# Patient Record
Sex: FEMALE | Race: WHITE | NOT HISPANIC OR LATINO | Employment: OTHER | URBAN - METROPOLITAN AREA
[De-identification: names, ages, dates, MRNs, and addresses within clinical notes are randomized per-mention and may not be internally consistent; named-entity substitution may affect disease eponyms.]

---

## 2017-01-23 RX ORDER — DESVENLAFAXINE SUCCINATE 50 MG
TABLET, EXTENDED RELEASE 24 HR ORAL
Qty: 90 TAB | Refills: 1 | Status: SHIPPED | OUTPATIENT
Start: 2017-01-23 | End: 2017-11-22

## 2017-02-06 ENCOUNTER — OFFICE VISIT (OUTPATIENT)
Dept: BEHAVIORAL HEALTH | Facility: PHYSICIAN GROUP | Age: 57
End: 2017-02-06
Payer: OTHER GOVERNMENT

## 2017-02-06 VITALS
HEART RATE: 80 BPM | RESPIRATION RATE: 16 BRPM | DIASTOLIC BLOOD PRESSURE: 82 MMHG | HEIGHT: 66 IN | TEMPERATURE: 98.1 F | SYSTOLIC BLOOD PRESSURE: 122 MMHG | WEIGHT: 174 LBS | BODY MASS INDEX: 27.97 KG/M2

## 2017-02-06 DIAGNOSIS — F41.1 GENERALIZED ANXIETY DISORDER: ICD-10-CM

## 2017-02-06 DIAGNOSIS — F33.0 MILD EPISODE OF RECURRENT MAJOR DEPRESSIVE DISORDER (HCC): ICD-10-CM

## 2017-02-06 PROCEDURE — 99213 OFFICE O/P EST LOW 20 MIN: CPT | Performed by: PSYCHIATRY & NEUROLOGY

## 2017-02-06 RX ORDER — ALPRAZOLAM 0.5 MG/1
0.5 TABLET ORAL NIGHTLY PRN
Qty: 30 TAB | Refills: 0 | Status: SHIPPED | OUTPATIENT
Start: 2017-02-06

## 2017-02-06 NOTE — PROGRESS NOTES
"[facility]  PSYCHIATRIC FOLLOW-UP NOTE    Name: Alyssa Monaco  MRN: [id number]  : 1960  Age: 56 y.o.  Date of assessment: 2017  PCP: STEVE Membreno  Persons in attendance: Patient    REASON FOR VISIT/CHIEF COMPLAINT (as stated by Patient):  Alyssa Monaco is a 56 y.o., White female, attending follow-up appointment for   Chief Complaint   Patient presents with   • Depression     The patient is seen today for follow up on her depression, anxiety, and insomnia. The patient reported dry mouth, and itching. The patient denied any rash.    .      HISTORY OF PRESENT ILLNESS:    The patient reported trouble getting in to sleep and she has been using lorazepam if needed. The patient reported dry mouth and mid itching in her lower extremities.    PSYCHOSOCIAL CHANGES SINCE PREVIOUS CONTACT:  The patients depression improved.    RESPONSE TO TREATMENT:  Good.    MEDICATION SIDE EFFECTS:  Dry mouth and mild itching in her hands and feet.    REVIEW OF SYSTEMS:        Constitutional positive - back pain.   Eyes negative   Ears/Nose/Mouth/Throat negative   Cardiovascular negative   Respiratory negative   Gastrointestinal negative   Genitourinary negative   Muscular negative   Integumentary negative   Neurological positive - tremor.   Endocrine positive - hyperlipidemia.   Hematologic/Lymphatic negative       PSYCHIATRIC EXAMINATION/MENTAL STATUS  /82 mmHg  Pulse 80  Temp(Src) 36.7 °C (98.1 °F)  Resp 16  Ht 1.676 m (5' 5.98\")  Wt 78.926 kg (174 lb)  BMI 28.10 kg/m2  Participation: Active verbal participation, Attentive and Engaged  Grooming:Casual  Orientation: Alert and Fully Oriented  Eye contact: Good  Behavior:Calm   Mood: Anxious  Affect: Flexible and Full range  Thought process: Logical and Goal-directed  Thought content:  Within normal limits  Speech: Rate within normal limits and Volume within normal limits  Perception:  Within normal limits  Memory:  No gross evidence of " memory deficits  Insight: Good  Judgment: Good  Family/couple interaction observations:   Other:    Current risk:    Suicide: Low   Homicide: Low   Self-harm: Low  Relapse: Low  Other:   Crisis Safety Plan reviewed?Yes  If evidence of imminent risk is present, intervention/plan:    Medical Records/Labs/Diagnostic Tests Reviewed: yes.    Medical Records/Labs/Diagnostic Tests Ordered: none.    DIAGNOSTIC IMPRESSION(S):  1. Mild episode of recurrent major depressive disorder (CMS-HCC)    2. Generalized anxiety disorder           ASSESSMENT AND PLAN:  Major depression  MACIEJ.    Decrease lamotrigine 100 mg per day  Pristiq 50 at night  Ambien and lorazepam as needed for sleep.  Follow up in two months.  More than 50% of face-to-face time in this 30 minute visit was spent in psychotherapy/counseling.    Topics addressed include:    Good sleep hygiene  Cognitive restructuring and behavioral changes  Improving interpersonal relationship.     Jayden Ruiz M.D.

## 2017-02-15 ENCOUNTER — TELEPHONE (OUTPATIENT)
Dept: BEHAVIORAL HEALTH | Facility: PHYSICIAN GROUP | Age: 57
End: 2017-02-15

## 2017-02-15 NOTE — TELEPHONE ENCOUNTER
Called back and she reported that she's been having increased depressive symptoms since her dose of Lamictal was decreased to 100 mg last week because of itchy skin. She has not noticed a difference in her skin problems with the Lamictal dose decrease but has noticed a significant decline in her mood. She is endorsing most symptoms of depression and is having difficulty getting out of the bed and motivation to do stuff around the house. She denies having a rash but endorses itchiness and her arms, knees and labial area. She has been working with her primary care physician and is using a steroid cream and over-the-counter Benadryl. Discussed increasing the dose of Lamictal to 150 mg to see if her mood improves. Instructed her to keep an eye on her skin problems and if she notices a rash to call the clinic again to discuss it further. She will call back next week to see if she needs to stay on 150 mg or increase it to 200 mg which was her previous dose.      ----- Message from Triston Harris Ass't sent at 2/15/2017  8:51 AM PST -----  Regarding: Decrease lamotrigine 100 mg per day  Contact: [phone]  Pt called stating that Dr. GONZALES, lowered her dose of the above Rx at the 2/6/17 appointment. Pt is experiencing depression, and a lot of negative thinking. Pt would like to know if she should go back to her normal dose, because at least then she can function (per pt).

## 2017-02-27 ENCOUNTER — OFFICE VISIT (OUTPATIENT)
Dept: BEHAVIORAL HEALTH | Facility: PHYSICIAN GROUP | Age: 57
End: 2017-02-27
Payer: OTHER GOVERNMENT

## 2017-02-27 VITALS
DIASTOLIC BLOOD PRESSURE: 76 MMHG | SYSTOLIC BLOOD PRESSURE: 120 MMHG | TEMPERATURE: 98.1 F | HEIGHT: 66 IN | WEIGHT: 174 LBS | HEART RATE: 86 BPM | RESPIRATION RATE: 16 BRPM | BODY MASS INDEX: 27.97 KG/M2

## 2017-02-27 DIAGNOSIS — F33.2 SEVERE EPISODE OF RECURRENT MAJOR DEPRESSIVE DISORDER, WITHOUT PSYCHOTIC FEATURES (HCC): ICD-10-CM

## 2017-02-27 PROCEDURE — 99213 OFFICE O/P EST LOW 20 MIN: CPT | Performed by: PSYCHIATRY & NEUROLOGY

## 2017-02-27 NOTE — PROGRESS NOTES
"[facility]  PSYCHIATRIC FOLLOW-UP NOTE    Name: Alyssa Monaco  MRN: [id number]  : 1960  Age: 56 y.o.  Date of assessment: 2017  PCP: STEVE Membreno  Persons in attendance: Patient    REASON FOR VISIT/CHIEF COMPLAINT (as stated by Patient):  Alyssa Monaco is a 56 y.o., White female, attending follow-up appointment for   Chief Complaint   Patient presents with   • Depression     The patient is seen today on emergency for irritataed depression and sideeffects from lamictal.   .      HISTORY OF PRESENT ILLNESS:    This is a 57 yo female, single, lives alone, seen today for follow up on her depression. The patient increased lamotrigine and her itching has been worse and she is getting rash in her mouth. The patient has been irritated all the time and she is sleeping onle few hours at night.    PSYCHOSOCIAL CHANGES SINCE PREVIOUS CONTACT:  Depressed, anxious, and irritataed.    RESPONSE TO TREATMENT:  Poor.    MEDICATION SIDE EFFECTS:  Rash.    REVIEW OF SYSTEMS:        Constitutional negative   Eyes negative   Ears/Nose/Mouth/Throat negative   Cardiovascular negative   Respiratory positive - sleep apnea, asthma   Gastrointestinal negative   Genitourinary negative   Muscular negative   Integumentary negative   Neurological negative   Endocrine positive - hyperlipidemia   Hematologic/Lymphatic negative       PSYCHIATRIC EXAMINATION/MENTAL STATUS  /76 mmHg  Pulse 86  Temp(Src) 36.7 °C (98.1 °F)  Resp 16  Ht 1.676 m (5' 5.98\")  Wt 78.926 kg (174 lb)  BMI 28.10 kg/m2  Participation: Active verbal participation and Attentive  Grooming:Casual  Orientation: Alert and Fully Oriented  Eye contact: Good  Behavior:Tense   Mood: Depressed and Anxious  Affect: Labile and Sad  Thought process: Logical and Goal-directed  Thought content:  Within normal limits  Speech: Rate within normal limits and Volume within normal limits  Perception:  Within normal limits  Memory:  No gross evidence " of memory deficits  Insight: Good  Judgment: Good  Family/couple interaction observations:   Other:    Current risk:    Suicide: Low   Homicide: Low   Self-harm: Low  Relapse: Low  Other:   Crisis Safety Plan reviewed?Yes  If evidence of imminent risk is present, intervention/plan:    Medical Records/Labs/Diagnostic Tests Reviewed: yes.    Medical Records/Labs/Diagnostic Tests Ordered: none.    DIAGNOSTIC IMPRESSION(S):  1. Severe episode of recurrent major depressive disorder, without psychotic features (CMS-HCC)           ASSESSMENT AND PLAN:  Major depression  Decrease lamotrigine 100 mg for one week, then half for one week then stop  Start aripiprazole 2 mg in AM after discussing risk, benefit, and alternative # 30 refills one  Continue pristiq 50 mg in AM  Ambien and xanax onle if needed and follow up in two weeks.     More than 50% of face-to-face time in this 30 minute visit was spent in psychotherapy/counseling.    Topics addressed include:  Good sleep hygiene  Cognitive restructuring and behavioral changes.    Jayden Ruiz M.D.

## 2017-03-10 ENCOUNTER — OFFICE VISIT (OUTPATIENT)
Dept: BEHAVIORAL HEALTH | Facility: PHYSICIAN GROUP | Age: 57
End: 2017-03-10
Payer: OTHER GOVERNMENT

## 2017-03-10 VITALS
WEIGHT: 174 LBS | HEART RATE: 82 BPM | DIASTOLIC BLOOD PRESSURE: 80 MMHG | TEMPERATURE: 98.1 F | HEIGHT: 66 IN | SYSTOLIC BLOOD PRESSURE: 124 MMHG | BODY MASS INDEX: 27.97 KG/M2

## 2017-03-10 DIAGNOSIS — F33.0 MILD EPISODE OF RECURRENT MAJOR DEPRESSIVE DISORDER (HCC): ICD-10-CM

## 2017-03-10 DIAGNOSIS — F41.1 GAD (GENERALIZED ANXIETY DISORDER): ICD-10-CM

## 2017-03-10 PROCEDURE — 99213 OFFICE O/P EST LOW 20 MIN: CPT | Performed by: PSYCHIATRY & NEUROLOGY

## 2017-03-10 RX ORDER — ARIPIPRAZOLE 2 MG/1
2 TABLET ORAL DAILY
Qty: 90 TAB | Refills: 1 | Status: SHIPPED | OUTPATIENT
Start: 2017-03-10

## 2017-03-10 NOTE — PROGRESS NOTES
"[facility]  PSYCHIATRIC FOLLOW-UP NOTE    Name: Alyssa Monaco  MRN: [id number]  : 1960  Age: 56 y.o.  Date of assessment: 3/10/2017  PCP: STEVE Membreno  Persons in attendance: Patient    REASON FOR VISIT/CHIEF COMPLAINT (as stated by Patient):  Alyssa Monaco is a 56 y.o., White female, attending follow-up appointment for   Chief Complaint   Patient presents with   • Depression     The patient is seen today for follow up and she has been feeling good on abilify 2 mg .   .      HISTORY OF PRESENT ILLNESS:    This is a 57 yo female, seen today for follow up and she has been feeling well. The patient has been taking pristiq 50 mg. Abilify 2 mg and uses ambien as needed. The patient reported her mind is clear and she is able to focus and her motivation improved.    PSYCHOSOCIAL CHANGES SINCE PREVIOUS CONTACT:  Depression improved.    RESPONSE TO TREATMENT:  Good.    MEDICATION SIDE EFFECTS:  Denied.    REVIEW OF SYSTEMS:        Constitutional positive - back pain   Eyes negative   Ears/Nose/Mouth/Throat negative   Cardiovascular negative   Respiratory positive - asthma   Gastrointestinal negative   Genitourinary negative   Muscular negative   Integumentary negative   Neurological positive - tremor   Endocrine positive - hyperlipidemia   Hematologic/Lymphatic negative       PSYCHIATRIC EXAMINATION/MENTAL STATUS  /80 mmHg  Pulse 82  Temp(Src) 36.7 °C (98.1 °F)  Ht 1.676 m (5' 5.98\")  Wt 78.926 kg (174 lb)  BMI 28.10 kg/m2  Participation: Active verbal participation, Attentive and Engaged  Grooming:Casual  Orientation: Alert and Fully Oriented  Eye contact: Good  Behavior:Calm   Mood: Anxious  Affect: Flexible and Full range  Thought process: Logical and Goal-directed  Thought content:  Within normal limits  Speech: Rate within normal limits and Volume within normal limits  Perception:  Within normal limits  Memory:  No gross evidence of memory deficits  Insight: Good  Judgment: " Good  Family/couple interaction observations:   Other:    Current risk:    Suicide: Low   Homicide: Low   Self-harm: Low  Relapse: Low  Other:   Crisis Safety Plan reviewed?Yes  If evidence of imminent risk is present, intervention/plan:    Medical Records/Labs/Diagnostic Tests Reviewed: yes.    Medical Records/Labs/Diagnostic Tests Ordered: none.    DIAGNOSTIC IMPRESSION(S):  1. Mild episode of recurrent major depressive disorder (CMS-HCC)    2. MACIEJ (generalized anxiety disorder)           ASSESSMENT AND PLAN:  Major depression  MACIEJ.  pristiq 50 mg one at night  Abilify 2 mg in AM # 90 refills one  Ambien 10 mg as needed  Stop lamotrigine.  Follow up in one month    More than 50% of face-to-face time in this 30 minute visit was spent in psychotherapy/counseling.    Topics addressed include:    Jayden Ruiz M.D.

## 2017-03-17 RX ORDER — LAMOTRIGINE 200 MG/1
TABLET ORAL
Qty: 90 TAB | Refills: 1 | Status: SHIPPED | OUTPATIENT
Start: 2017-03-17

## 2017-04-06 ENCOUNTER — OFFICE VISIT (OUTPATIENT)
Dept: BEHAVIORAL HEALTH | Facility: PHYSICIAN GROUP | Age: 57
End: 2017-04-06
Payer: OTHER GOVERNMENT

## 2017-04-06 VITALS
HEART RATE: 78 BPM | BODY MASS INDEX: 27.32 KG/M2 | SYSTOLIC BLOOD PRESSURE: 118 MMHG | WEIGHT: 170 LBS | TEMPERATURE: 98.1 F | DIASTOLIC BLOOD PRESSURE: 78 MMHG | RESPIRATION RATE: 16 BRPM | HEIGHT: 66 IN

## 2017-04-06 DIAGNOSIS — F33.0 MILD EPISODE OF RECURRENT MAJOR DEPRESSIVE DISORDER (HCC): ICD-10-CM

## 2017-04-06 DIAGNOSIS — F41.1 GAD (GENERALIZED ANXIETY DISORDER): ICD-10-CM

## 2017-04-06 PROCEDURE — 99213 OFFICE O/P EST LOW 20 MIN: CPT | Performed by: PSYCHIATRY & NEUROLOGY

## 2017-04-06 PROCEDURE — 90833 PSYTX W PT W E/M 30 MIN: CPT | Performed by: PSYCHIATRY & NEUROLOGY

## 2017-04-06 NOTE — PROGRESS NOTES
"[facility]  PSYCHIATRIC FOLLOW-UP NOTE    Name: Alyssa Monaco  MRN: [id number]  : 1960  Age: 56 y.o.  Date of assessment: 2017  PCP: STEVE Membreno  Persons in attendance: Patient    REASON FOR VISIT/CHIEF COMPLAINT (as stated by Patient):  Alyssa Monaco is a 56 y.o., White female, attending follow-up appointment for   Chief Complaint   Patient presents with   • Depression     The patient is seen today for follow up and she has been feeling better after a long time.    .      HISTORY OF PRESENT ILLNESS:    This is a 55 yo female, single, seen today for follow up. The patient is back in her old self and she is active and started to feel better. The patient is meditating thirty minutes twice a day. The patient is physically active and lost few pounds. The patient is working on her book.    PSYCHOSOCIAL CHANGES SINCE PREVIOUS CONTACT:  Less depressed and less anxious.    RESPONSE TO TREATMENT:  Good.    MEDICATION SIDE EFFECTS:  Denied.    REVIEW OF SYSTEMS:        Constitutional positive - low back pain   Eyes negative   Ears/Nose/Mouth/Throat negative   Cardiovascular negative   Respiratory positive - asthma, obstructive sleep apnea   Gastrointestinal negative   Genitourinary negative   Muscular negative   Integumentary negative   Neurological positive - migraine   Endocrine positive - hyperlipidemia   Hematologic/Lymphatic negative       PSYCHIATRIC EXAMINATION/MENTAL STATUS  /78 mmHg  Pulse 78  Temp(Src) 36.7 °C (98.1 °F)  Resp 16  Ht 1.676 m (5' 5.98\")  Wt 77.111 kg (170 lb)  BMI 27.45 kg/m2  Participation: Active verbal participation, Attentive and Engaged  Grooming:Casual  Orientation: Alert and Fully Oriented  Eye contact: Good  Behavior:Calm   Mood: Anxious  Affect: Flexible and Full range  Thought process: Logical and Goal-directed  Thought content:  Within normal limits  Speech: Rate within normal limits and Volume within normal limits  Perception:  Within " normal limits  Memory:  No gross evidence of memory deficits  Insight: Good  Judgment: Good  Family/couple interaction observations:   Other:    Current risk:    Suicide: Low   Homicide: Low   Self-harm: Low  Relapse: Low  Other:   Crisis Safety Plan reviewed?Yes  If evidence of imminent risk is present, intervention/plan:    Medical Records/Labs/Diagnostic Tests Reviewed: yes.    Medical Records/Labs/Diagnostic Tests Ordered: none.    DIAGNOSTIC IMPRESSION(S):  1. Mild episode of recurrent major depressive disorder (CMS-HCC)    2. MACIEJ (generalized anxiety disorder)           ASSESSMENT AND PLAN:  Major depression  MACIEJ.    Pristiq 50 mg in AM  Abilify 2 mg in AM  Ambien 10 mg as needed.    Pharmacy will contact for refills  Follow up in three months.    More than 50% of face-to-face time in this 30 minute visit was spent in psychotherapy/counseling.    Topics addressed include:  Good sleep hygiene  Meditation and breathing exercise.  Cognitive restructuring and behavioral changes.    Jayden Ruiz M.D.

## 2017-05-19 ENCOUNTER — TELEPHONE (OUTPATIENT)
Dept: BEHAVIORAL HEALTH | Facility: PHYSICIAN GROUP | Age: 57
End: 2017-05-19

## 2017-06-12 ENCOUNTER — OFFICE VISIT (OUTPATIENT)
Dept: BEHAVIORAL HEALTH | Facility: PHYSICIAN GROUP | Age: 57
End: 2017-06-12
Payer: OTHER GOVERNMENT

## 2017-06-12 DIAGNOSIS — F51.01 PRIMARY INSOMNIA: ICD-10-CM

## 2017-06-12 DIAGNOSIS — F41.0 PANIC ATTACKS: ICD-10-CM

## 2017-06-12 DIAGNOSIS — F33.2 SEVERE EPISODE OF RECURRENT MAJOR DEPRESSIVE DISORDER, WITHOUT PSYCHOTIC FEATURES (HCC): ICD-10-CM

## 2017-06-12 PROCEDURE — 99214 OFFICE O/P EST MOD 30 MIN: CPT | Performed by: PSYCHIATRY & NEUROLOGY

## 2017-06-12 RX ORDER — ALPRAZOLAM 0.5 MG/1
0.5 TABLET ORAL NIGHTLY PRN
Qty: 30 TAB | Refills: 0 | Status: SHIPPED | OUTPATIENT
Start: 2017-06-12 | End: 2018-11-12 | Stop reason: SDUPTHER

## 2017-06-12 RX ORDER — DESVENLAFAXINE 100 MG/1
100 TABLET, EXTENDED RELEASE ORAL DAILY
Qty: 90 TAB | Refills: 1 | Status: SHIPPED | OUTPATIENT
Start: 2017-06-12 | End: 2017-11-22

## 2017-06-12 RX ORDER — DESVENLAFAXINE 100 MG/1
100 TABLET, EXTENDED RELEASE ORAL DAILY
Qty: 90 TAB | Refills: 1 | Status: SHIPPED | OUTPATIENT
Start: 2017-06-12 | End: 2017-11-21 | Stop reason: SDUPTHER

## 2017-06-12 NOTE — PROGRESS NOTES
"[facility]  PSYCHIATRIC FOLLOW-UP NOTE    Name: Alyssa Monaco  MRN: [id number]  : 1960  Age: 56 y.o.  Date of assessment: 2017  PCP: STEVE Membreno  Persons in attendance: Patient      REASON FOR VISIT/CHIEF COMPLAINT (as stated by Patient):  Alyssa Monaco is a 56 y.o., White female, attending follow-up appointment for   Chief Complaint   Patient presents with   • Depression     The patient is seen today on emergency because she has been depressed, anxious, not sleeping well and she is afraid.    .      HISTORY OF PRESENT ILLNESS:    This is a 55 yo female, seen leda on emergency because she has been depressed all the time, decreased motivation, anxiety attacks frequently, sleeping only few hours at night. The patient is afraid that she is not going to get better. The patient tried oxcarbazepine, lamotrigine but she had rash. The patient failed prozac, paxil, zoloft, and she had suicidal thoughts from bupropion. The patient had been to [facility] before. THe patient has been seeing Dr. Jose A Phillips for therapy.       PSYCHOSOCIAL CHANGES SINCE PREVIOUS CONTACT:  The patient is depressed, sad, anxious, and not sleeping.    RESPONSE TO TREATMENT:  Poor.    MEDICATION SIDE EFFECTS:  Weight gain louie abilify.    REVIEW OF SYSTEMS:        Constitutional negative   Eyes negative   Ears/Nose/Mouth/Throat negative   Cardiovascular negative   Respiratory positive - asthma, obstructive sleep apnea   Gastrointestinal negative   Genitourinary negative   Muscular negative   Integumentary positive - low back pain, migraine   Neurological negative   Endocrine positive - hyperlipidemia   Hematologic/Lymphatic negative       PSYCHIATRIC EXAMINATION/MENTAL STATUS  /76 mmHg  Pulse 0  Temp(Src) 36.7 °C (98.1 °F)  Resp 16  Ht 1.676 m (5' 5.98\")  Wt 75.751 kg (167 lb)  BMI 26.97 kg/m2  Participation: Active verbal participation and Attentive  Grooming:Casual  Orientation: Alert and Fully " Oriented  Eye contact: Good  Behavior:Tense   Mood: Depressed and Anxious  Affect: Sad and Anxious  Thought process: Logical and Goal-directed  Thought content:  Within normal limits  Speech: Rate within normal limits and Volume within normal limits  Perception:  Within normal limits  Memory:  No gross evidence of memory deficits  Insight: Good  Judgment: Good  Family/couple interaction observations:   Other:    Current risk:    Suicide: Low   Homicide: Low   Self-harm: Low  Relapse: Low  Other:   Crisis Safety Plan reviewed?Yes  If evidence of imminent risk is present, intervention/plan:    Medical Records/Labs/Diagnostic Tests Reviewed: yes.    Medical Records/Labs/Diagnostic Tests Ordered: none.    DIAGNOSTIC IMPRESSION(S):  1. Severe episode of recurrent major depressive disorder, without psychotic features (CMS-HCC)    2. Primary insomnia    3. Panic attacks           ASSESSMENT AND PLAN:  Major depression  Panic disorder  Insomnia.    Increase pristiq 100 mg per day # 90 refills one  start alprazolam 0.5 mg one at night as needed for sleep # 30 NR  Continue therapy  Follow up in three weeks  Call if needed.    16 minutes were spent in psychotherapy.  (If greater than 16 minutes, add 62764 code)   Topics addressed in psychotherapy include:  Good sleep hygiene  Relaxation and breathing exercise  Cognitive restructuring and changing maladaptive behavior.  Jayden Ruiz M.D.

## 2017-07-10 ENCOUNTER — OFFICE VISIT (OUTPATIENT)
Dept: BEHAVIORAL HEALTH | Facility: PHYSICIAN GROUP | Age: 57
End: 2017-07-10
Payer: OTHER GOVERNMENT

## 2017-07-10 VITALS
HEIGHT: 66 IN | DIASTOLIC BLOOD PRESSURE: 78 MMHG | TEMPERATURE: 98.1 F | BODY MASS INDEX: 26.2 KG/M2 | RESPIRATION RATE: 16 BRPM | SYSTOLIC BLOOD PRESSURE: 120 MMHG | WEIGHT: 163 LBS | HEART RATE: 76 BPM

## 2017-07-10 VITALS
DIASTOLIC BLOOD PRESSURE: 76 MMHG | BODY MASS INDEX: 26.84 KG/M2 | RESPIRATION RATE: 16 BRPM | TEMPERATURE: 98.1 F | WEIGHT: 167 LBS | HEART RATE: 78 BPM | HEIGHT: 66 IN | SYSTOLIC BLOOD PRESSURE: 122 MMHG

## 2017-07-10 DIAGNOSIS — F33.0 MILD EPISODE OF RECURRENT MAJOR DEPRESSIVE DISORDER (HCC): ICD-10-CM

## 2017-07-10 DIAGNOSIS — F41.0 PANIC DISORDER: ICD-10-CM

## 2017-07-10 DIAGNOSIS — F51.01 PRIMARY INSOMNIA: ICD-10-CM

## 2017-07-10 PROCEDURE — 90833 PSYTX W PT W E/M 30 MIN: CPT | Performed by: PSYCHIATRY & NEUROLOGY

## 2017-07-10 PROCEDURE — 99213 OFFICE O/P EST LOW 20 MIN: CPT | Performed by: PSYCHIATRY & NEUROLOGY

## 2017-07-10 RX ORDER — ALPRAZOLAM 0.5 MG/1
0.5 TABLET ORAL NIGHTLY PRN
Qty: 30 TAB | Refills: 1 | Status: SHIPPED
Start: 2017-07-10 | End: 2018-08-07 | Stop reason: SDUPTHER

## 2017-07-10 NOTE — PROGRESS NOTES
"[facility]  PSYCHIATRIC FOLLOW-UP NOTE    Name: Alyssa Monaco  MRN: [id number]  : 1960  Age: 56 y.o.  Date of assessment: 7/10/2017  PCP: STEVE Membreno  Persons in attendance: Patient  REASON FOR VISIT/CHIEF COMPLAINT (as stated by Patient):  Alyssa Monaco is a 56 y.o., White female, attending follow-up appointment for   Chief Complaint   Patient presents with   • Medication Management     The patient is seen today for follow up on her depression, anxiety, and insomnia.   .      HISTORY OF PRESENT ILLNESS:    This a 57 yo female, single, seen today for follow up. The patient has been taking pristiq 100 mg at night and she is using alprazolam for sleep. The patient has been sleeping six to shayna hours at night and she feels rested in the mornig. The patients pet is feeling better and she is on medications. The patient is going for a retreat in one month. The patient denied having any suicidal thoughts.    PSYCHOSOCIAL CHANGES SINCE PREVIOUS CONTACT:  The patients depression, anxiety and insomnia is improving.    RESPONSE TO TREATMENT:  Good.    MEDICATION SIDE EFFECTS:  Denied.    REVIEW OF SYSTEMS:        Constitutional negative   Eyes negative   Ears/Nose/Mouth/Throat negative   Cardiovascular negative   Respiratory positive - asthma, obstructive sleep apnea   Gastrointestinal negative   Genitourinary negative   Muscular negative   Integumentary positive - low back pain   Neurological positive - migraine   Endocrine positive - hyperlipidemia   Hematologic/Lymphatic negative       PSYCHIATRIC EXAMINATION/MENTAL STATUS  /78 mmHg  Pulse 76  Temp(Src) 36.7 °C (98.1 °F)  Resp 16  Ht 1.676 m (5' 5.98\")  Wt 73.936 kg (163 lb)  BMI 26.32 kg/m2  Participation: Active verbal participation, Attentive and Engaged  Grooming:Casual  Orientation: Alert and Fully Oriented  Eye contact: Good  Behavior:Calm   Mood: Anxious  Affect: Anxious  Thought process: Logical  Thought content:  " Within normal limits  Speech: Rate within normal limits and Volume within normal limits  Perception:  Within normal limits  Memory:  No gross evidence of memory deficits  Insight: Good  Judgment: Good  Family/couple interaction observations:   Other:    Current risk:    Suicide: Low   Homicide: Low   Self-harm: Low  Relapse: Low  Other:   Crisis Safety Plan reviewed?Yes  If evidence of imminent risk is present, intervention/plan:    Medical Records/Labs/Diagnostic Tests Reviewed: yes.    Medical Records/Labs/Diagnostic Tests Ordered: none.    DIAGNOSTIC IMPRESSION(S):  1. Mild episode of recurrent major depressive disorder (CMS-HCC)    2. Panic disorder    3. Primary insomnia           ASSESSMENT AND PLAN:     1. Major depression, recurrent   2. Panic disorder    Pristiq 100 mg one at night.    3. Panic disorder  Alprazolam 0.5 mg one at night # 30 refills one.  NARx checked.  The patient signed controlled medication treatment agreement today.     4. Follow up in one month.    16 minutes were spent in psychotherapy.  (If greater than 16 minutes, add 43247 code)   Topics addressed in psychotherapy include:  We talked about cognitive restructuring and exposure elements to day.  The patient is practicing breathing exercise and relaxation method.  The patient is gong for one week Seminar next months.  The patient is using yoga practice every day.  Jayden Ruiz M.D.

## 2017-08-10 ENCOUNTER — APPOINTMENT (OUTPATIENT)
Dept: BEHAVIORAL HEALTH | Facility: PHYSICIAN GROUP | Age: 57
End: 2017-08-10
Payer: OTHER GOVERNMENT

## 2017-12-01 ENCOUNTER — OFFICE VISIT (OUTPATIENT)
Dept: BEHAVIORAL HEALTH | Facility: PHYSICIAN GROUP | Age: 57
End: 2017-12-01
Payer: OTHER GOVERNMENT

## 2017-12-01 VITALS
BODY MASS INDEX: 26.2 KG/M2 | HEART RATE: 76 BPM | TEMPERATURE: 97.9 F | DIASTOLIC BLOOD PRESSURE: 76 MMHG | RESPIRATION RATE: 16 BRPM | SYSTOLIC BLOOD PRESSURE: 122 MMHG | HEIGHT: 66 IN | WEIGHT: 163 LBS

## 2017-12-01 DIAGNOSIS — F33.0 MILD EPISODE OF RECURRENT MAJOR DEPRESSIVE DISORDER (HCC): ICD-10-CM

## 2017-12-01 DIAGNOSIS — F41.0 PANIC DISORDER: ICD-10-CM

## 2017-12-01 PROCEDURE — 99213 OFFICE O/P EST LOW 20 MIN: CPT | Performed by: PSYCHIATRY & NEUROLOGY

## 2017-12-01 RX ORDER — ALPRAZOLAM 0.5 MG/1
0.5 TABLET ORAL NIGHTLY PRN
Qty: 30 TAB | Refills: 1 | Status: SHIPPED
Start: 2017-12-01

## 2017-12-01 ASSESSMENT — PATIENT HEALTH QUESTIONNAIRE - PHQ9
8. MOVING OR SPEAKING SO SLOWLY THAT OTHER PEOPLE COULD HAVE NOTICED. OR THE OPPOSITE, BEING SO FIGETY OR RESTLESS THAT YOU HAVE BEEN MOVING AROUND A LOT MORE THAN USUAL: 0
4. FEELING TIRED OR HAVING LITTLE ENERGY: 1
6. FEELING BAD ABOUT YOURSELF - OR THAT YOU ARE A FAILURE OR HAVE LET YOURSELF OR YOUR FAMILY DOWN: 1
1. LITTLE INTEREST OR PLEASURE IN DOING THINGS: 1
3. TROUBLE FALLING OR STAYING ASLEEP OR SLEEPING TOO MUCH: 1
SUM OF ALL RESPONSES TO PHQ QUESTIONS 1-9: 7
2. FEELING DOWN, DEPRESSED, IRRITABLE, OR HOPELESS: 1
5. POOR APPETITE OR OVEREATING: 1
SUM OF ALL RESPONSES TO PHQ9 QUESTIONS 1 AND 2: 2
7. TROUBLE CONCENTRATING ON THINGS, SUCH AS READING THE NEWSPAPER OR WATCHING TELEVISION: 1
9. THOUGHTS THAT YOU WOULD BE BETTER OFF DEAD, OR OF HURTING YOURSELF: 0

## 2017-12-01 NOTE — PROGRESS NOTES
"[facility]  PSYCHIATRIC FOLLOW-UP NOTE    Name: Alyssa Monaco  MRN: [id number]  : 1960  Age: 57 y.o.  Date of assessment: 2017  PCP: STEVE Membreno  Persons in attendance: Patient  REASON FOR VISIT/CHIEF COMPLAINT (as stated by Patient):  Alyssa Monaco is a 57 y.o., White female, attending follow-up appointment for   Chief Complaint   Patient presents with   • Medication Management     I have been feeling good and I refilled pristiq. I need a prescription for xanax.   .      HISTORY OF PRESENT ILLNESS:    This is a 57 y.o. Female, single, lives alone, seen today for follow up. The patient has been taking pristiq 100 mg in AM and she refilled it few days ago but she got the generic form. The patient has been taking alprazolam 0.5 mg only if needed at night for her anxiety. The patient has been using meditation and yoga every day and that is helping her to calm down. The patient lost her pet and she is working through her grief.       PSYCHOSOCIAL CHANGES SINCE PREVIOUS CONTACT:  The patient denied depression and her anxiety improved.     RESPONSE TO TREATMENT:  pristiq 100 mg in AM and alprazolam 0.5 mg only if needed and she requested a refill.    MEDICATION SIDE EFFECTS:  Denied.    REVIEW OF SYSTEMS:        Constitutional negative   Eyes negative   Ears/Nose/Mouth/Throat negative   Cardiovascular negative   Respiratory positive - asthma, obstructive sleep apnea   Gastrointestinal negative   Genitourinary negative   Muscular negative   Integumentary positive - low back pain   Neurological positive - migraine   Endocrine positive - hyperlipidemia.   Hematologic/Lymphatic negative       PSYCHIATRIC EXAMINATION/MENTAL STATUS  /76   Pulse 76   Temp 36.6 °C (97.9 °F)   Resp 16   Ht 1.676 m (5' 5.98\")   Wt 73.9 kg (163 lb)   BMI 26.32 kg/m²   Participation: Active verbal participation, Attentive and Engaged  Grooming:Neat  Orientation: Alert and Fully Oriented  Eye " contact: Good  Behavior:Calm   Mood: Euthymic  Affect: Flexible and Full range  Thought process: Logical and Goal-directed  Thought content:  Within normal limits  Speech: Rate within normal limits and Volume within normal limits  Perception:  Within normal limits  Memory:  No gross evidence of memory deficits  Insight: Good  Judgment: Good  Family/couple interaction observations:   Other:    Current risk:    Suicide: Low   Homicide: Low   Self-harm: Low  Relapse: Low  Other:   Crisis Safety Plan reviewed?Yes  If evidence of imminent risk is present, intervention/plan:    Medical Records/Labs/Diagnostic Tests Reviewed: yes.    Medical Records/Labs/Diagnostic Tests Ordered: none.    DIAGNOSTIC IMPRESSION(S):  1. Panic disorder    2. Mild episode of recurrent major depressive disorder (CMS-HCC)           ASSESSMENT AND PLAN:    1. Major depression  pristiq 100 mg one a day generic form.    2. Panic disorder  Alprazolam 0.5 mg as needed # 30 refills one.    3. Follow up in three months.      16 minutes were spent in psychotherapy.  (If greater than 16 minutes, add 21982 code)   Topics addressed in psychotherapy include:    Jayden Ruiz M.D.

## 2018-01-17 ENCOUNTER — OFFICE VISIT (OUTPATIENT)
Dept: BEHAVIORAL HEALTH | Facility: PHYSICIAN GROUP | Age: 58
End: 2018-01-17
Payer: OTHER GOVERNMENT

## 2018-01-17 VITALS
HEART RATE: 80 BPM | TEMPERATURE: 98.2 F | BODY MASS INDEX: 26.03 KG/M2 | WEIGHT: 162 LBS | SYSTOLIC BLOOD PRESSURE: 118 MMHG | RESPIRATION RATE: 16 BRPM | HEIGHT: 66 IN | DIASTOLIC BLOOD PRESSURE: 74 MMHG

## 2018-01-17 DIAGNOSIS — F41.0 PANIC DISORDER: ICD-10-CM

## 2018-01-17 DIAGNOSIS — F33.0 MILD EPISODE OF RECURRENT MAJOR DEPRESSIVE DISORDER (HCC): ICD-10-CM

## 2018-01-17 PROCEDURE — 99213 OFFICE O/P EST LOW 20 MIN: CPT | Performed by: PSYCHIATRY & NEUROLOGY

## 2018-01-17 PROCEDURE — 90833 PSYTX W PT W E/M 30 MIN: CPT | Performed by: PSYCHIATRY & NEUROLOGY

## 2018-01-17 ASSESSMENT — PATIENT HEALTH QUESTIONNAIRE - PHQ9
1. LITTLE INTEREST OR PLEASURE IN DOING THINGS: 1
7. TROUBLE CONCENTRATING ON THINGS, SUCH AS READING THE NEWSPAPER OR WATCHING TELEVISION: 0
8. MOVING OR SPEAKING SO SLOWLY THAT OTHER PEOPLE COULD HAVE NOTICED. OR THE OPPOSITE, BEING SO FIGETY OR RESTLESS THAT YOU HAVE BEEN MOVING AROUND A LOT MORE THAN USUAL: 0
4. FEELING TIRED OR HAVING LITTLE ENERGY: 1
6. FEELING BAD ABOUT YOURSELF - OR THAT YOU ARE A FAILURE OR HAVE LET YOURSELF OR YOUR FAMILY DOWN: 1
SUM OF ALL RESPONSES TO PHQ QUESTIONS 1-9: 6
2. FEELING DOWN, DEPRESSED, IRRITABLE, OR HOPELESS: 1
SUM OF ALL RESPONSES TO PHQ9 QUESTIONS 1 AND 2: 2
3. TROUBLE FALLING OR STAYING ASLEEP OR SLEEPING TOO MUCH: 1
9. THOUGHTS THAT YOU WOULD BE BETTER OFF DEAD, OR OF HURTING YOURSELF: 0
5. POOR APPETITE OR OVEREATING: 1

## 2018-01-17 NOTE — LETTER
2018        Alyssa Monaco   1960  MR # [id number].      To Whom It May Concern:      Alyssa Monaco has been under my care at [facility] and she has been suffering from Major Depression, recurrent, severe. The patient is taking Pristiq 100 mg every day for her depression.    I refer this patient For TMS to help with her Depression and anxiety.    sincerely      Jayden Ruiz MD.  Staff psychiatrist.

## 2018-01-18 NOTE — PROGRESS NOTES
"[facility]  PSYCHIATRIC FOLLOW-UP NOTE    Name: Alyssa Monaco  MRN: [id number]  : 1960  Age: 57 y.o.  Date of assessment: 2018  PCP: STEVE Membreno  Persons in attendance: Patient  REASON FOR VISIT/CHIEF COMPLAINT (as stated by Patient):  Alyssa Monaco is a 57 y.o., White female, attending follow-up appointment for   Chief Complaint   Patient presents with   • Medication Management     I would like to try TMS and I need a referal.   .      HISTORY OF PRESENT ILLNESS:    This is a 56 yo female, single, lives alone, seen today for follow up. The patient was checking TMS and she found out that in [place] they are giving TMS. The patient needs a letter so she can get the authorization through her insurance. The patient is taking pristiq 100 mg per day and she would like to get off from the medication.     PSYCHOSOCIAL CHANGES SINCE PREVIOUS CONTACT:  The patients depression improved.    RESPONSE TO TREATMENT:  pristiq 100 mg per day.    MEDICATION SIDE EFFECTS:  Denied.    REVIEW OF SYSTEMS:        Constitutional negative   Eyes negative   Ears/Nose/Mouth/Throat negative   Cardiovascular negative   Respiratory positive - asthma obstructive sleep apnea   Gastrointestinal negative   Genitourinary negative   Muscular negative   Integumentary positive - low back pain   Neurological positive - migraine   Endocrine positive - hyperlipidemia.   Hematologic/Lymphatic negative       PSYCHIATRIC EXAMINATION/MENTAL STATUS  /74   Pulse 80   Temp 36.8 °C (98.2 °F)   Resp 16   Ht 1.676 m (5' 5.98\")   Wt 73.5 kg (162 lb)   BMI 26.16 kg/m²   Participation: Active verbal participation, Attentive and Engaged  Grooming:Neat  Orientation: Alert and Fully Oriented  Eye contact: Good  Behavior:Calm   Mood: Euthymic  Affect: Flexible and Full range  Thought process: Logical and Goal-directed  Thought content:  Within normal limits  Speech: Rate within normal limits and Volume within normal " limits  Perception:  Within normal limits  Memory:  No gross evidence of memory deficits  Insight: Good  Judgment: Good  Family/couple interaction observations:   Other:    Current risk:    Suicide: Low   Homicide: Low   Self-harm: Low  Relapse: Low  Other:   Crisis Safety Plan reviewed?Yes  If evidence of imminent risk is present, intervention/plan:    Medical Records/Labs/Diagnostic Tests Reviewed: yes.    Medical Records/Labs/Diagnostic Tests Ordered: none.    DIAGNOSTIC IMPRESSION(S):  1. Panic disorder    2. Mild episode of recurrent major depressive disorder (CMS-[facility])           ASSESSMENT AND PLAN:    1. Panic disorder  2. Major depression  pristiq 100 mg per day.  Xanax only if needed.    Letter for TMS    3. Follow up in two months.      16 minutes were spent in psychotherapy.  (If greater than 16 minutes, add 99520 code)   Topics addressed in psychotherapy include:  The patient is improving her black and white thinking and find the gray.  Uses relaxation and exercise.  Cognitive restructuring and behavioral changes.  Jayden Ruiz M.D.

## 2018-01-23 ENCOUNTER — OFFICE VISIT (OUTPATIENT)
Dept: MEDICAL GROUP | Facility: CLINIC | Age: 58
End: 2018-01-23
Payer: OTHER GOVERNMENT

## 2018-01-23 VITALS
DIASTOLIC BLOOD PRESSURE: 70 MMHG | BODY MASS INDEX: 28.25 KG/M2 | HEIGHT: 66 IN | WEIGHT: 175.8 LBS | SYSTOLIC BLOOD PRESSURE: 118 MMHG | RESPIRATION RATE: 16 BRPM | OXYGEN SATURATION: 100 % | TEMPERATURE: 98.1 F | HEART RATE: 70 BPM

## 2018-01-23 DIAGNOSIS — F41.9 ANXIETY: ICD-10-CM

## 2018-01-23 DIAGNOSIS — F33.2 MAJOR DEPRESSIVE DISORDER, RECURRENT, SEVERE WITHOUT PSYCHOTIC FEATURES (HCC): ICD-10-CM

## 2018-01-23 PROCEDURE — 99212 OFFICE O/P EST SF 10 MIN: CPT | Performed by: NURSE PRACTITIONER

## 2018-01-23 ASSESSMENT — PATIENT HEALTH QUESTIONNAIRE - PHQ9: CLINICAL INTERPRETATION OF PHQ2 SCORE: 0

## 2018-01-23 NOTE — PROGRESS NOTES
CC: Referral Needed (pshychiatry  procedure TMS)        HPI:     Alyssa presents today for the followin. Major depressive disorder, recurrent, severe without psychotic features (CMS-HCC)/Anxiety  Current patient of outpatient psychiatry Dr. Oshea. She also has outside psychologist. She is doing really well on her medications states however that she was recommended for TMS by psychiatrist and she was told by his office but the referral needed to come from our office.    Current Outpatient Prescriptions   Medication Sig Dispense Refill   • Desvenlafaxine Succinate 100 MG TABLET SR 24 HR TAKE 1 TABLET DAILY 90 Tab 0   • methocarbamol (ROBAXIN) 500 MG Tab Take 1,000 mg by mouth 4 times a day.     • mometasone (ASMANEX) 220 MCG/INH inhaler Inhale 2 Puffs by mouth every day.     • estradiol (CLIMARA) 0.05 MG/24HR PATCH WEEKLY Apply 1 Patch to skin as directed every 7 days.     • fluticasone (FLONASE) 50 MCG/ACT nasal spray Spray 1 Spray in nose every day.     • ibuprofen (MOTRIN) 800 MG TABS Take 600 mg by mouth every 8 hours as needed.     • alprazolam (XANAX) 0.5 MG Tab Take 1 Tab by mouth at bedtime as needed for Anxiety. 30 Tab 1   • alprazolam (XANAX) 0.5 MG Tab Take 1 Tab by mouth at bedtime as needed for Sleep. 30 Tab 1   • alprazolam (XANAX) 0.5 MG Tab Take 1 Tab by mouth at bedtime as needed for Sleep. 30 Tab 0   • lamotrigine (LAMICTAL) 200 MG tablet TAKE 1 TABLET DAILY 90 Tab 1   • aripiprazole (ABILIFY) 2 MG tablet Take 1 Tab by mouth every day. 90 Tab 1   • alprazolam (XANAX) 0.5 MG Tab Take 1 Tab by mouth at bedtime as needed for Sleep. 30 Tab 0   • alprazolam (XANAX) 0.5 MG Tab Take 1 Tab by mouth at bedtime as needed for Sleep. 30 Tab 0   • oxcarbazepine (TRILEPTAL) 300 MG Tab TAKE 1 TABLET BY MOUTH AT BEDTIME FOR 2 DAYS THEN 1 TABLET BY MOUTH TWICE A DAY  1   • zolpidem (AMBIEN) 10 MG Tab Take 10 mg by mouth at bedtime as needed for Sleep.     • alprazolam (XANAX) 0.5 MG Tab Take 1 Tab by mouth  "2 times a day as needed for Sleep or Anxiety. 20 Tab 0   • artificial tears 1.4 % Solution Place 1 Drop in both eyes as needed.     • bacitracin-polymyxin b (POLYSPORIN) 500-98864 UNIT/GM Ointment by Ophthalmic route every 12 hours.     • promethazine (PHENERGAN) 25 MG Suppos Insert 25 mg in rectum every 6 hours as needed for Nausea/Vomiting.     • carboxymethylcellulose (REFRESH PLUS) 0.5 % Solution 1 Drop as needed.     • diphenhydrAMINE (BENADRYL) 25 MG capsule Take 25 mg by mouth every 6 hours as needed.     • NON SPECIFIED asthmanex     • ketotifen (ZADITOR) 0.025 % ophthalmic solution Place 1 Drop in both eyes 2 times a day.     • zolmitriptan (ZOMIG) 5 MG tablet Take 5 mg by mouth as needed.     • omeprazole (PRILOSEC) 20 MG CPDR Take 1 Cap by mouth every day. 60 Cap 11   • albuterol-ipratropium (COMBIVENT)  MCG/ACT AERO Inhale 2 Puffs by mouth as needed.       No current facility-administered medications for this visit.      Social History   Substance Use Topics   • Smoking status: Never Smoker   • Smokeless tobacco: Never Used   • Alcohol use No     I reviewed patients allergies, problem list and medications today in EPIC.    ROS: Any/all pertinent positives listed in the HPI, otherwise all others reviewed are negative today.      /70   Pulse 70   Temp 36.7 °C (98.1 °F)   Resp 16   Ht 1.676 m (5' 5.98\")   Wt 79.7 kg (175 lb 12.8 oz)   SpO2 100%   Breastfeeding? No   BMI 28.39 kg/m²       Gen: Alert and oriented, No apparent distress. WDWN  Psych: A+Ox3, normal affect and mood  Skin: Warm, dry and intact. Good turgor   No rashes in visible areas.  Eye: Conjunctiva clear, lids normal  ENMT: Lips without lesions, good dentition  Lungs: Clear to auscultation bilaterally, no rales or rhonchi   Unlabored respiratory effort.   CV: Regular rate and rhythm, S1, S2. No murmurs.   No Edema        Assessment and Plan.   57 y.o. female with the following issues.    1. Major depressive disorder, " recurrent, severe without psychotic features (CMS-HCC)/Anxiety  Stable. Doing well. Managed by outpatient psychiatry. Referral placed. She is urged to contact the referral department if she doesn't hear anything in 7 days to follow-up with this referral  - REFERRAL TO OTHER

## 2018-02-22 RX ORDER — DESVENLAFAXINE 100 MG/1
TABLET, EXTENDED RELEASE ORAL
Qty: 90 TAB | Refills: 1 | Status: SHIPPED | OUTPATIENT
Start: 2018-02-22 | End: 2018-08-07 | Stop reason: SDUPTHER

## 2018-02-22 RX ORDER — DESVENLAFAXINE 100 MG/1
TABLET, EXTENDED RELEASE ORAL
Qty: 90 TAB | Refills: 1 | Status: SHIPPED | OUTPATIENT
Start: 2018-02-22 | End: 2018-02-22 | Stop reason: SDUPTHER

## 2018-03-12 ENCOUNTER — OFFICE VISIT (OUTPATIENT)
Dept: BEHAVIORAL HEALTH | Facility: PHYSICIAN GROUP | Age: 58
End: 2018-03-12
Payer: OTHER GOVERNMENT

## 2018-03-12 VITALS
RESPIRATION RATE: 16 BRPM | WEIGHT: 172 LBS | TEMPERATURE: 97.9 F | HEART RATE: 78 BPM | DIASTOLIC BLOOD PRESSURE: 74 MMHG | SYSTOLIC BLOOD PRESSURE: 120 MMHG | HEIGHT: 66 IN | BODY MASS INDEX: 27.64 KG/M2

## 2018-03-12 DIAGNOSIS — F41.0 PANIC DISORDER: ICD-10-CM

## 2018-03-12 DIAGNOSIS — F33.1 MODERATE EPISODE OF RECURRENT MAJOR DEPRESSIVE DISORDER (HCC): ICD-10-CM

## 2018-03-12 PROCEDURE — 99213 OFFICE O/P EST LOW 20 MIN: CPT | Performed by: PSYCHIATRY & NEUROLOGY

## 2018-03-12 PROCEDURE — 90833 PSYTX W PT W E/M 30 MIN: CPT | Performed by: PSYCHIATRY & NEUROLOGY

## 2018-03-12 ASSESSMENT — PATIENT HEALTH QUESTIONNAIRE - PHQ9
8. MOVING OR SPEAKING SO SLOWLY THAT OTHER PEOPLE COULD HAVE NOTICED. OR THE OPPOSITE, BEING SO FIGETY OR RESTLESS THAT YOU HAVE BEEN MOVING AROUND A LOT MORE THAN USUAL: 0
9. THOUGHTS THAT YOU WOULD BE BETTER OFF DEAD, OR OF HURTING YOURSELF: 0
1. LITTLE INTEREST OR PLEASURE IN DOING THINGS: 2
7. TROUBLE CONCENTRATING ON THINGS, SUCH AS READING THE NEWSPAPER OR WATCHING TELEVISION: 0
4. FEELING TIRED OR HAVING LITTLE ENERGY: 1
5. POOR APPETITE OR OVEREATING: 1
6. FEELING BAD ABOUT YOURSELF - OR THAT YOU ARE A FAILURE OR HAVE LET YOURSELF OR YOUR FAMILY DOWN: 2
SUM OF ALL RESPONSES TO PHQ9 QUESTIONS 1 AND 2: 4
SUM OF ALL RESPONSES TO PHQ QUESTIONS 1-9: 10
3. TROUBLE FALLING OR STAYING ASLEEP OR SLEEPING TOO MUCH: 2
2. FEELING DOWN, DEPRESSED, IRRITABLE, OR HOPELESS: 2

## 2018-03-12 NOTE — PROGRESS NOTES
"[facility]  PSYCHIATRIC FOLLOW-UP NOTE    Name: Alyssa Monaco  MRN: [id number]  : 1960  Age: 57 y.o.  Date of assessment: 3/12/2018  PCP: STEVE Membreno  Persons in attendance: Patient  REASON FOR VISIT/CHIEF COMPLAINT (as stated by Patient):  Alyssa Monaco is a 57 y.o., White female, attending follow-up appointment for   Chief Complaint   Patient presents with   • Medication Management     I went to see the psychiatrist in [facility] and she is going to do TMS and the insurance is cover for 37 treatment.   .      HISTORY OF PRESENT ILLNESS:    This is 56 yo female, single, lives alone, seen today for follow up. The patient is going for TMS next month and she is is going for six weeks treatment. Each treatment for thirty minutes. The most common side effects are headache and rare chance of seizure.        PSYCHOSOCIAL CHANGES SINCE PREVIOUS CONTACT:  Depressed and anxious all the time.    RESPONSE TO TREATMENT:  pristiq 100 mg one at night.    MEDICATION SIDE EFFECTS:  Denied.    REVIEW OF SYSTEMS:        Constitutional negative   Eyes negative   Ears/Nose/Mouth/Throat negative   Cardiovascular negative   Respiratory positive - obstructive sleep apnea, asthma.   Gastrointestinal negative   Genitourinary negative   Muscular negative - none.   Integumentary negative   Neurological positive - migraine   Endocrine positive - hyperlipidemia.   Hematologic/Lymphatic negative       PSYCHIATRIC EXAMINATION/MENTAL STATUS  /74   Pulse 78   Temp 36.6 °C (97.9 °F)   Resp 16   Ht 1.676 m (5' 5.98\")   Wt 78 kg (172 lb)   BMI 27.77 kg/m²   Participation: Active verbal participation, Attentive and Engaged  Grooming:Neat  Orientation: Alert and Fully Oriented  Eye contact: Good  Behavior:Calm   Mood: Anxious  Affect: Flexible and Full range  Thought process: Logical and Goal-directed  Thought content:  Within normal limits  Speech: Rate within normal limits and Volume within normal " limits  Perception:  Within normal limits  Memory:  No gross evidence of memory deficits  Insight: Good  Judgment: Good  Family/couple interaction observations:   Other:    Current risk:    Suicide: Low   Homicide: Low   Self-harm: Low  Relapse: Low  Other:   Crisis Safety Plan reviewed?Yes  If evidence of imminent risk is present, intervention/plan:    Medical Records/Labs/Diagnostic Tests Reviewed: yes.    Medical Records/Labs/Diagnostic Tests Ordered: none.    DIAGNOSTIC IMPRESSION(S):  1. Panic disorder    2. Moderate episode of recurrent major depressive disorder (CMS-[facility])           ASSESSMENT AND PLAN:    1. Panic disorder  2. Major depression  pristiq 100 mg one at night.    3. Start TMS in one month.    4. Follow up in three months.    16 minutes were spent in psychotherapy.  (If greater than 16 minutes, add 99672 code)   Topics addressed in psychotherapy include:  The patient is working on her self esteem   Cognitive restructuring and behavioral changes.  The is able to work on her negative automatic thoughts.  Jayden Ruiz M.D.

## 2018-04-05 ENCOUNTER — DOCUMENTATION (OUTPATIENT)
Dept: BEHAVIORAL HEALTH | Facility: PHYSICIAN GROUP | Age: 58
End: 2018-04-05

## 2018-04-17 ENCOUNTER — DOCUMENTATION (OUTPATIENT)
Dept: BEHAVIORAL HEALTH | Facility: PHYSICIAN GROUP | Age: 58
End: 2018-04-17

## 2018-05-03 ENCOUNTER — DOCUMENTATION (OUTPATIENT)
Dept: BEHAVIORAL HEALTH | Facility: PHYSICIAN GROUP | Age: 58
End: 2018-05-03

## 2018-05-14 ENCOUNTER — DOCUMENTATION (OUTPATIENT)
Dept: BEHAVIORAL HEALTH | Facility: PHYSICIAN GROUP | Age: 58
End: 2018-05-14

## 2018-05-17 ENCOUNTER — DOCUMENTATION (OUTPATIENT)
Dept: BEHAVIORAL HEALTH | Facility: PHYSICIAN GROUP | Age: 58
End: 2018-05-17

## 2018-05-25 ENCOUNTER — DOCUMENTATION (OUTPATIENT)
Dept: BEHAVIORAL HEALTH | Facility: PHYSICIAN GROUP | Age: 58
End: 2018-05-25

## 2018-06-05 ENCOUNTER — OFFICE VISIT (OUTPATIENT)
Dept: BEHAVIORAL HEALTH | Facility: PHYSICIAN GROUP | Age: 58
End: 2018-06-05
Payer: OTHER GOVERNMENT

## 2018-06-05 ENCOUNTER — DOCUMENTATION (OUTPATIENT)
Dept: BEHAVIORAL HEALTH | Facility: PHYSICIAN GROUP | Age: 58
End: 2018-06-05

## 2018-06-05 VITALS
HEART RATE: 76 BPM | HEIGHT: 66 IN | TEMPERATURE: 98.1 F | SYSTOLIC BLOOD PRESSURE: 116 MMHG | DIASTOLIC BLOOD PRESSURE: 72 MMHG | BODY MASS INDEX: 28.28 KG/M2 | RESPIRATION RATE: 16 BRPM | WEIGHT: 176 LBS

## 2018-06-05 DIAGNOSIS — F33.0 MILD EPISODE OF RECURRENT MAJOR DEPRESSIVE DISORDER (HCC): ICD-10-CM

## 2018-06-05 DIAGNOSIS — F41.0 PANIC DISORDER: ICD-10-CM

## 2018-06-05 PROCEDURE — 99213 OFFICE O/P EST LOW 20 MIN: CPT | Performed by: PSYCHIATRY & NEUROLOGY

## 2018-06-05 PROCEDURE — 90833 PSYTX W PT W E/M 30 MIN: CPT | Performed by: PSYCHIATRY & NEUROLOGY

## 2018-06-05 ASSESSMENT — PATIENT HEALTH QUESTIONNAIRE - PHQ9
6. FEELING BAD ABOUT YOURSELF - OR THAT YOU ARE A FAILURE OR HAVE LET YOURSELF OR YOUR FAMILY DOWN: 1
2. FEELING DOWN, DEPRESSED, IRRITABLE, OR HOPELESS: 1
9. THOUGHTS THAT YOU WOULD BE BETTER OFF DEAD, OR OF HURTING YOURSELF: 0
7. TROUBLE CONCENTRATING ON THINGS, SUCH AS READING THE NEWSPAPER OR WATCHING TELEVISION: 1
SUM OF ALL RESPONSES TO PHQ9 QUESTIONS 1 AND 2: 2
8. MOVING OR SPEAKING SO SLOWLY THAT OTHER PEOPLE COULD HAVE NOTICED. OR THE OPPOSITE, BEING SO FIGETY OR RESTLESS THAT YOU HAVE BEEN MOVING AROUND A LOT MORE THAN USUAL: 0
5. POOR APPETITE OR OVEREATING: 1
4. FEELING TIRED OR HAVING LITTLE ENERGY: 1
1. LITTLE INTEREST OR PLEASURE IN DOING THINGS: 1
SUM OF ALL RESPONSES TO PHQ QUESTIONS 1-9: 7
3. TROUBLE FALLING OR STAYING ASLEEP OR SLEEPING TOO MUCH: 1

## 2018-06-05 NOTE — PROGRESS NOTES
"[facility]  PSYCHIATRIC FOLLOW-UP NOTE    Name: Alyssa Monaco  MRN: [id number]  : 1960  Age: 57 y.o.  Date of assessment: 2018  PCP: STEVE Membreno  Persons in attendance: Patient  REASON FOR VISIT/CHIEF COMPLAINT (as stated by Patient):  Alyssa Monaco is a 57 y.o., White female, attending follow-up appointment for   Chief Complaint   Patient presents with   • Depression     I have TMS treatment 35 times and it is helping me and the last treatment is tomorrow.   .      HISTORY OF PRESENT ILLNESS:    This is 58 yo female, single, lives alone, seen today for follow up. The patient had 35 treatment with TMS and she started to fell better. The patient has been taking pristiq 100 mg every night and xanax only if needed for sleep. The patient is back to [place] and she is back in her regular life.    PSYCHOSOCIAL CHANGES SINCE PREVIOUS CONTACT:  Depression improved abut feeling anxious.    RESPONSE TO TREATMENT:  Continue pristiq 100 mg one at night. Alprazolam only if needed.    MEDICATION SIDE EFFECTS:  Denied.    REVIEW OF SYSTEMS:        Constitutional negative   Eyes negative   Ears/Nose/Mouth/Throat negative   Cardiovascular negative   Respiratory positive - obstructive sleep apnea, asthma   Gastrointestinal negative   Genitourinary negative   Muscular negative   Integumentary negative   Neurological positive - migraine   Endocrine positive - hyperlipidemia.   Hematologic/Lymphatic negative       PSYCHIATRIC EXAMINATION/MENTAL STATUS  /72   Pulse 76   Temp 36.7 °C (98.1 °F)   Resp 16   Ht 1.676 m (5' 5.98\")   Wt 79.8 kg (176 lb)   BMI 28.42 kg/m²   Participation: Active verbal participation, Attentive and Engaged  Grooming:Neat  Orientation: Alert and Fully Oriented  Eye contact: Good  Behavior:Calm   Mood: Anxious  Affect: Flexible and Full range  Thought process: Logical and Goal-directed  Thought content:  Within normal limits  Speech: Rate within normal limits and " Volume within normal limits  Perception:  Within normal limits  Memory:  No gross evidence of memory deficits  Insight: Good  Judgment: Good  Family/couple interaction observations:   Other:    Current risk:    Suicide: Low   Homicide: Low   Self-harm: Low  Relapse: Low  Other:   Crisis Safety Plan reviewed?Yes  If evidence of imminent risk is present, intervention/plan:    Medical Records/Labs/Diagnostic Tests Reviewed: yes.    Medical Records/Labs/Diagnostic Tests Ordered: none.    DIAGNOSTIC IMPRESSION(S):  1. Mild episode of recurrent major depressive disorder (HCC)    2. Panic disorder           ASSESSMENT AND PLAN:  1. Major depression  pristiq 100 mg one at night    2. Panic disorder.  Alprazolam as needed.    3. Follow up in two months.    16 minutes were spent in psychotherapy.  (If greater than 16 minutes, add 86827 code)   Topics addressed in psychotherapy include:  We discussed unresolved emotional issues and talked about some emotional skills.  We focused on her social skills, self control, and problem solving skills.  Cognitive clarification and emotional clarifications.  Jayden Ruiz M.D.

## 2018-06-12 ENCOUNTER — DOCUMENTATION (OUTPATIENT)
Dept: BEHAVIORAL HEALTH | Facility: PHYSICIAN GROUP | Age: 58
End: 2018-06-12

## 2018-08-07 ENCOUNTER — OFFICE VISIT (OUTPATIENT)
Dept: BEHAVIORAL HEALTH | Facility: PHYSICIAN GROUP | Age: 58
End: 2018-08-07
Payer: OTHER GOVERNMENT

## 2018-08-07 VITALS
WEIGHT: 177 LBS | TEMPERATURE: 97.7 F | RESPIRATION RATE: 16 BRPM | BODY MASS INDEX: 28.45 KG/M2 | HEART RATE: 72 BPM | SYSTOLIC BLOOD PRESSURE: 120 MMHG | DIASTOLIC BLOOD PRESSURE: 72 MMHG | HEIGHT: 66 IN

## 2018-08-07 DIAGNOSIS — F33.0 MILD EPISODE OF RECURRENT MAJOR DEPRESSIVE DISORDER (HCC): ICD-10-CM

## 2018-08-07 DIAGNOSIS — F40.01 PANIC DISORDER WITH AGORAPHOBIA: ICD-10-CM

## 2018-08-07 PROCEDURE — 99213 OFFICE O/P EST LOW 20 MIN: CPT | Performed by: PSYCHIATRY & NEUROLOGY

## 2018-08-07 PROCEDURE — 90833 PSYTX W PT W E/M 30 MIN: CPT | Performed by: PSYCHIATRY & NEUROLOGY

## 2018-08-07 RX ORDER — ALPRAZOLAM 0.5 MG/1
TABLET ORAL
Qty: 30 TAB | Refills: 0 | Status: SHIPPED | OUTPATIENT
Start: 2018-08-07 | End: 2018-09-06

## 2018-08-07 RX ORDER — DESVENLAFAXINE 100 MG/1
TABLET, EXTENDED RELEASE ORAL
Qty: 90 TAB | Refills: 1 | Status: SHIPPED | OUTPATIENT
Start: 2018-08-07 | End: 2018-11-12 | Stop reason: SDUPTHER

## 2018-08-07 ASSESSMENT — PATIENT HEALTH QUESTIONNAIRE - PHQ9
9. THOUGHTS THAT YOU WOULD BE BETTER OFF DEAD, OR OF HURTING YOURSELF: 0
1. LITTLE INTEREST OR PLEASURE IN DOING THINGS: 1
6. FEELING BAD ABOUT YOURSELF - OR THAT YOU ARE A FAILURE OR HAVE LET YOURSELF OR YOUR FAMILY DOWN: 0
2. FEELING DOWN, DEPRESSED, IRRITABLE, OR HOPELESS: 1
SUM OF ALL RESPONSES TO PHQ QUESTIONS 1-9: 5
SUM OF ALL RESPONSES TO PHQ9 QUESTIONS 1 AND 2: 2
5. POOR APPETITE OR OVEREATING: 0
7. TROUBLE CONCENTRATING ON THINGS, SUCH AS READING THE NEWSPAPER OR WATCHING TELEVISION: 1
4. FEELING TIRED OR HAVING LITTLE ENERGY: 1
3. TROUBLE FALLING OR STAYING ASLEEP OR SLEEPING TOO MUCH: 1
8. MOVING OR SPEAKING SO SLOWLY THAT OTHER PEOPLE COULD HAVE NOTICED. OR THE OPPOSITE, BEING SO FIGETY OR RESTLESS THAT YOU HAVE BEEN MOVING AROUND A LOT MORE THAN USUAL: 0

## 2018-08-07 NOTE — PROGRESS NOTES
"[facility]  PSYCHIATRIC FOLLOW-UP NOTE    Name: Alyssa Monaco  MRN: [id number]  : 1960  Age: 57 y.o.  Date of assessment: 2018  PCP: STEVE Membreno  Persons in attendance: Patient  REASON FOR VISIT/CHIEF COMPLAINT (as stated by Patient):  Alyssa Monaco is a 57 y.o., White female, attending follow-up appointment for   Chief Complaint   Patient presents with   • Anxiety     I have lot of anxiety and mysister lives with me for few weeks with her pets and grand children.   .      HISTORY OF PRESENT ILLNESS:    This is 58 yo female, single, lives alone, seen today for follow up after two months. The patient finished with her TMS treatment and she is feeling better. The patient is taking pristiq 100 mg every day and using xanax only if needed for anxiety. The patient reported increased stress because her sister is living with her with four cats and two of her grand children. They are going to be there for seven weeks. 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 The patient is using meditation, and mindfulness every day.     PSYCHOSOCIAL CHANGES SINCE PREVIOUS CONTACT:  Denied depression and her anxiety improved.     RESPONSE TO TREATMENT:  pristiq 100 mg every day and alprazolam only if needed.    MEDICATION SIDE EFFECTS:  Denied.    REVIEW OF SYSTEMS:        Constitutional negative   Eyes negative   Ears/Nose/Mouth/Throat negative   Cardiovascular negative   Respiratory positive - obstructive sleep apnea.asthma.   Gastrointestinal negative   Genitourinary negative   Muscular negative   Integumentary negative   Neurological positive - migraine   Endocrine positive - hyperlipidemia.   Hematologic/Lymphatic negative       PSYCHIATRIC EXAMINATION/MENTAL STATUS  /72   Pulse 72   Temp 36.5 °C (97.7 °F)   Resp 16   Ht 1.676 m (5' 5.98\")   Wt 80.3 kg (177 lb)   BMI 28.58 kg/m²   Participation: Active verbal participation, " Attentive and Engaged  Grooming:Neat  Orientation: Alert and Fully Oriented  Eye contact: Good  Behavior:Calm   Mood: Anxious  Affect: Flexible and Full range  Thought process: Logical and Goal-directed  Thought content:  Within normal limits  Speech: Rate within normal limits and Volume within normal limits  Perception:  Within normal limits  Memory:  No gross evidence of memory deficits  Insight: Good  Judgment: Good  Family/couple interaction observations:   Other:    Current risk:    Suicide: Low   Homicide: Low   Self-harm: Low  Relapse: Low  Other:   Crisis Safety Plan reviewed?Yes  If evidence of imminent risk is present, intervention/plan:    Medical Records/Labs/Diagnostic Tests Reviewed: yes.    Medical Records/Labs/Diagnostic Tests Ordered: none.    DIAGNOSTIC IMPRESSION(S):  1. Mild episode of recurrent major depressive disorder (HCC)    2. Panic disorder with agoraphobia           ASSESSMENT AND PLAN:    1. Major depression  Pristiq 100 mg one a day # 90 refills one.    2. Panic disorder.  Alprazolam 0.5 mg prn # 30 NR.    3. Follow up in three months.    16 minutes were spent in psychotherapy.  (If greater than 16 minutes, add 19939 code)   Topics addressed in psychotherapy include:  Psycho education and cognitive clarifications.  We discussed her negative automatic thoughts and helped her to process it.  Agreed to keep her daily routine and a good sleep cycle.  Jayden Ruiz M.D.

## 2018-11-07 ENCOUNTER — APPOINTMENT (OUTPATIENT)
Dept: BEHAVIORAL HEALTH | Facility: CLINIC | Age: 58
End: 2018-11-07
Payer: OTHER GOVERNMENT

## 2018-11-12 ENCOUNTER — OFFICE VISIT (OUTPATIENT)
Dept: BEHAVIORAL HEALTH | Facility: CLINIC | Age: 58
End: 2018-11-12
Payer: OTHER GOVERNMENT

## 2018-11-12 VITALS
BODY MASS INDEX: 27.48 KG/M2 | SYSTOLIC BLOOD PRESSURE: 118 MMHG | HEIGHT: 66 IN | RESPIRATION RATE: 16 BRPM | HEART RATE: 74 BPM | DIASTOLIC BLOOD PRESSURE: 74 MMHG | WEIGHT: 171 LBS

## 2018-11-12 DIAGNOSIS — G47.00 INSOMNIA, UNSPECIFIED TYPE: ICD-10-CM

## 2018-11-12 DIAGNOSIS — F41.1 GAD (GENERALIZED ANXIETY DISORDER): ICD-10-CM

## 2018-11-12 DIAGNOSIS — F33.41 RECURRENT MAJOR DEPRESSIVE DISORDER, IN PARTIAL REMISSION (HCC): ICD-10-CM

## 2018-11-12 PROCEDURE — 90833 PSYTX W PT W E/M 30 MIN: CPT | Performed by: PSYCHIATRY & NEUROLOGY

## 2018-11-12 PROCEDURE — 99213 OFFICE O/P EST LOW 20 MIN: CPT | Performed by: PSYCHIATRY & NEUROLOGY

## 2018-11-12 RX ORDER — ALPRAZOLAM 0.5 MG/1
0.5 TABLET ORAL NIGHTLY PRN
Qty: 30 TAB | Refills: 0 | Status: SHIPPED | OUTPATIENT
Start: 2018-11-12 | End: 2018-12-12

## 2018-11-12 RX ORDER — DESVENLAFAXINE 100 MG/1
TABLET, EXTENDED RELEASE ORAL
Qty: 90 TAB | Refills: 3 | Status: SHIPPED | OUTPATIENT
Start: 2018-11-12

## 2018-11-12 ASSESSMENT — PATIENT HEALTH QUESTIONNAIRE - PHQ9: CLINICAL INTERPRETATION OF PHQ2 SCORE: 0

## 2018-11-12 NOTE — BH THERAPY
"[facility]  PSYCHIATRIC FOLLOW-UP NOTE    Name: Alyssa Monaco  MRN: [id number]  : 1960  Age: 58 y.o.  Date of assessment: 2018  PCP: STEVE Membreno  Persons in attendance: Patient  REASON FOR VISIT/CHIEF COMPLAINT (as stated by Patient):  Alyssa Monaco is a 58 y.o., White female, attending follow-up appointment for   Chief Complaint   Patient presents with   • Medication Management     The patient is seen today for follow up on her depression, anxiety, and insomnia.   .      HISTORY OF PRESENT ILLNESS:    This is 57 yo female, single, lives alone, seen today for follow up after three months.The patient had TMS and she did well on it. The patient talked about moving in to [place] in one month. The patient tried to buy a house but now renting an apartment. The patient packed every thing and she is moving out in one month. The patient is trying to establish through mental health in [facility]. The patient is feeling well om her pristiq.       PSYCHOSOCIAL CHANGES SINCE PREVIOUS CONTACT:  The patient denied depression and she requested a refill on xanax.    RESPONSE TO TREATMENT:  She is taking pristiq 100 mg every day and xanax 0.5 mg only of needed for sleep.    MEDICATION SIDE EFFECTS:  Denied.    REVIEW OF SYSTEMS:        Constitutional negative   Eyes negative   Ears/Nose/Mouth/Throat negative   Cardiovascular negative   Respiratory positive - obstructive sleep apnea.   Gastrointestinal negative   Genitourinary negative   Muscular negative   Integumentary negative   Neurological positive - migraine   Endocrine positive - hyperlipidemia.   Hematologic/Lymphatic negative       PSYCHIATRIC EXAMINATION/MENTAL STATUS  /74   Pulse 74   Resp 16   Ht 1.676 m (5' 5.98\")   Wt 77.6 kg (171 lb)   BMI 27.61 kg/m²   Participation: Active verbal participation, Attentive and Engaged  Grooming:Neat  Orientation: Alert and Fully Oriented  Eye contact: Good  Behavior:Calm   Mood: " Anxious  Affect: Flexible and Full range  Thought process: Logical and Goal-directed  Thought content:  Within normal limits  Speech: Rate within normal limits and Volume within normal limits  Perception:  Within normal limits  Memory:  No gross evidence of memory deficits  Insight: Good  Judgment: Good  Family/couple interaction observations:   Other:    Current risk:    Suicide: Low   Homicide: Low   Self-harm: Low  Relapse: Low  Other:   Crisis Safety Plan reviewed?Yes  If evidence of imminent risk is present, intervention/plan:    Medical Records/Labs/Diagnostic Tests Reviewed: yes.    Medical Records/Labs/Diagnostic Tests Ordered: none.    DIAGNOSTIC IMPRESSION(S):  1. Recurrent major depressive disorder, in partial remission (HCC)    2. MACIEJ (generalized anxiety disorder)    3. Insomnia, unspecified type           ASSESSMENT AND PLAN:  1. Major depression.  2. MACIEJ.  Continue pristiq 100 mg one da y # 90 refills three.    3. Insomnia.  Alprazolam 0.5 mg prn # 30 NR.    4. Follow up in [place].    16 minutes were spent in psychotherapy.  (If greater than 16 minutes, add 99104 code)   Topics addressed in psychotherapy include:  We discussed termination of treatment  And she is following up in [place].   Jayden Ruiz M.D.

## 2021-03-15 DIAGNOSIS — Z23 NEED FOR VACCINATION: ICD-10-CM

## 2024-05-04 ENCOUNTER — OFFICE VISIT (OUTPATIENT)
Dept: URGENT CARE | Facility: CLINIC | Age: 64
End: 2024-05-04
Payer: OTHER GOVERNMENT

## 2024-05-04 VITALS
HEIGHT: 65 IN | WEIGHT: 153.3 LBS | RESPIRATION RATE: 14 BRPM | HEART RATE: 66 BPM | SYSTOLIC BLOOD PRESSURE: 126 MMHG | TEMPERATURE: 98.6 F | OXYGEN SATURATION: 97 % | BODY MASS INDEX: 25.54 KG/M2 | DIASTOLIC BLOOD PRESSURE: 82 MMHG

## 2024-05-04 DIAGNOSIS — H00.014 HORDEOLUM EXTERNUM OF LEFT UPPER EYELID: ICD-10-CM

## 2024-05-04 PROCEDURE — 3074F SYST BP LT 130 MM HG: CPT | Performed by: PHYSICIAN ASSISTANT

## 2024-05-04 PROCEDURE — 3079F DIAST BP 80-89 MM HG: CPT | Performed by: PHYSICIAN ASSISTANT

## 2024-05-04 PROCEDURE — 99203 OFFICE O/P NEW LOW 30 MIN: CPT | Performed by: PHYSICIAN ASSISTANT

## 2024-05-04 RX ORDER — CLONAZEPAM 0.5 MG/1
TABLET ORAL
COMMUNITY
Start: 2024-04-04

## 2024-05-04 RX ORDER — ERYTHROMYCIN 5 MG/G
1 OINTMENT OPHTHALMIC 3 TIMES DAILY
Qty: 3.5 G | Refills: 0 | Status: SHIPPED | OUTPATIENT
Start: 2024-05-04

## 2024-05-04 ASSESSMENT — ENCOUNTER SYMPTOMS
NAUSEA: 0
EYE ITCHING: 0
COUGH: 0
VOMITING: 0
FEVER: 0
BLURRED VISION: 0
EYE DISCHARGE: 0
HEADACHES: 0
DOUBLE VISION: 0
EYE REDNESS: 0
FOREIGN BODY SENSATION: 0

## 2024-05-04 ASSESSMENT — VISUAL ACUITY: OU: 1

## 2024-05-04 NOTE — PROGRESS NOTES
Subjective     Sally Monaco is a 63 y.o. female who presents with Eye Problem (Swollen upper eyelid, L eye x today, requesting paper script )            This is a new problem.  The patient presents to clinic complaining of swelling to her left upper eyelid.  The patient states she woke up this morning with a localized area of swelling to her left upper eyelid with slight pain/tenderness and mild redness.    Eye Problem   The left eye is affected. This is a new problem. The current episode started today. The problem occurs constantly. The problem has been unchanged. There was no injury mechanism. The pain is mild. Pertinent negatives include no blurred vision, eye discharge, double vision, eye redness, fever, foreign body sensation, itching, nausea, recent URI or vomiting. She has tried nothing for the symptoms.     PMH:  has a past medical history of ASTHMA (6/10/2011), Depression (6/10/2011), Environmental allergies (6/10/2011), Hyperlipidemia (6/10/2011), and Migraine (6/10/2011).  MEDS:   Current Outpatient Medications:     clonazePAM (KLONOPIN) 0.5 MG Tab, , Disp: , Rfl:     alprazolam (XANAX) 0.5 MG Tab, Take 1 Tab by mouth at bedtime as needed for Anxiety., Disp: 30 Tab, Rfl: 1    zolpidem (AMBIEN) 10 MG Tab, Take 10 mg by mouth at bedtime as needed for Sleep., Disp: , Rfl:     methocarbamol (ROBAXIN) 500 MG Tab, Take 1,000 mg by mouth 4 times a day., Disp: , Rfl:     carboxymethylcellulose (REFRESH PLUS) 0.5 % Solution, 1 Drop as needed., Disp: , Rfl:     ketotifen (ZADITOR) 0.025 % ophthalmic solution, Place 1 Drop in both eyes 2 times a day., Disp: , Rfl:     albuterol-ipratropium (COMBIVENT)  MCG/ACT AERO, Inhale 2 Puffs by mouth as needed., Disp: , Rfl:     ibuprofen (MOTRIN) 800 MG TABS, Take 600 mg by mouth every 8 hours as needed., Disp: , Rfl:     Desvenlafaxine Succinate 100 MG TABLET SR 24 HR, TAKE 1 TABLET DAILY, Disp: 90 Tab, Rfl: 3    lamotrigine (LAMICTAL) 200 MG tablet, TAKE 1 TABLET  DAILY, Disp: 90 Tab, Rfl: 1    aripiprazole (ABILIFY) 2 MG tablet, Take 1 Tab by mouth every day., Disp: 90 Tab, Rfl: 1    alprazolam (XANAX) 0.5 MG Tab, Take 1 Tab by mouth at bedtime as needed for Sleep., Disp: 30 Tab, Rfl: 0    alprazolam (XANAX) 0.5 MG Tab, Take 1 Tab by mouth at bedtime as needed for Sleep., Disp: 30 Tab, Rfl: 0    oxcarbazepine (TRILEPTAL) 300 MG Tab, TAKE 1 TABLET BY MOUTH AT BEDTIME FOR 2 DAYS THEN 1 TABLET BY MOUTH TWICE A DAY, Disp: , Rfl: 1    alprazolam (XANAX) 0.5 MG Tab, Take 1 Tab by mouth 2 times a day as needed for Sleep or Anxiety., Disp: 20 Tab, Rfl: 0    mometasone (ASMANEX) 220 MCG/INH inhaler, Inhale 2 Puffs by mouth every day., Disp: , Rfl:     artificial tears 1.4 % Solution, Place 1 Drop in both eyes as needed., Disp: , Rfl:     bacitracin-polymyxin b (POLYSPORIN) 500-03746 UNIT/GM Ointment, by Ophthalmic route every 12 hours., Disp: , Rfl:     estradiol (CLIMARA) 0.05 MG/24HR PATCH WEEKLY, Apply 1 Patch to skin as directed every 7 days., Disp: , Rfl:     promethazine (PHENERGAN) 25 MG Suppos, Insert 25 mg in rectum every 6 hours as needed for Nausea/Vomiting., Disp: , Rfl:     diphenhydrAMINE (BENADRYL) 25 MG capsule, Take 25 mg by mouth every 6 hours as needed., Disp: , Rfl:     NON SPECIFIED, asthmanex, Disp: , Rfl:     fluticasone (FLONASE) 50 MCG/ACT nasal spray, Spray 1 Spray in nose every day., Disp: , Rfl:     zolmitriptan (ZOMIG) 5 MG tablet, Take 5 mg by mouth as needed., Disp: , Rfl:     omeprazole (PRILOSEC) 20 MG CPDR, Take 1 Cap by mouth every day., Disp: 60 Cap, Rfl: 11  ALLERGIES: No Known Allergies  SURGHX:   Past Surgical History:   Procedure Laterality Date    ABDOMINAL HYSTERECTOMY TOTAL      has ovaries    CHOLECYSTECTOMY      COLONOSCOPY  2006, 2011 repeat in 10y     SOCHX:  reports that she has never smoked. She has never used smokeless tobacco. She reports that she does not drink alcohol and does not use drugs.  FH: Family history was reviewed, no  "pertinent findings to report    Review of Systems   Constitutional:  Negative for fever.   HENT:  Negative for congestion.    Eyes:  Negative for blurred vision, double vision, discharge, redness and itching.   Respiratory:  Negative for cough.    Gastrointestinal:  Negative for nausea and vomiting.   Neurological:  Negative for headaches.              Objective     /82 (BP Location: Left arm, Patient Position: Sitting, BP Cuff Size: Adult)   Pulse 66   Temp 37 °C (98.6 °F) (Temporal)   Resp 14   Ht 1.651 m (5' 5\")   Wt 69.5 kg (153 lb 4.8 oz)   SpO2 97%   BMI 25.51 kg/m²      Physical Exam  Constitutional:       General: She is not in acute distress.     Appearance: Normal appearance. She is well-developed. She is not ill-appearing.   HENT:      Head: Normocephalic and atraumatic.      Right Ear: External ear normal.      Left Ear: External ear normal.      Nose: Nose normal.   Eyes:      General: Lids are everted, no foreign bodies appreciated. Vision grossly intact.         Left eye: Hordeolum present.No foreign body or discharge.      Extraocular Movements: Extraocular movements intact.      Conjunctiva/sclera: Conjunctivae normal.      Left eye: Left conjunctiva is not injected.      Comments:   A localized area of edema is present to the medial aspect of the left upper eyelid with mild tenderness and trace overlying erythema.  No increased warmth.  No periorbital edema.  No periorbital erythema.  The patient's left conjunctiva was clear without injection or discharge/drainage.   Cardiovascular:      Rate and Rhythm: Normal rate.   Pulmonary:      Effort: Pulmonary effort is normal.   Musculoskeletal:      Cervical back: Normal range of motion and neck supple.   Skin:     General: Skin is warm and dry.   Neurological:      Mental Status: She is alert and oriented to person, place, and time.                             Assessment & Plan        1. Hordeolum externum of left upper eyelid  - " erythromycin 5 MG/GM Ointment; Apply 1 Application to left eye 3 times a day.  Dispense: 3.5 g; Refill: 0    Differential diagnoses, supportive care, and indications for immediate follow-up discussed with patient.   Instructed to return to clinic or nearest emergency department for any change in condition, further concerns, or worsening of symptoms.    OTC Tylenol or Motrin for fever/discomfort.  Apply warm compresses to the affected area  Avoid touching eyes  Hand Hygiene   Follow-up with PCP  AVS printed  Return to clinic or go to the ED if symptoms worsen or fail to improve, or if patient should develop worsening/increasing/persistent eye redness, eye drainage, eye pain, eye itchiness, vision changes, periorbital redness or swelling, headache, fever/chills, and/or any concerning symptoms.    Discussed plan with the patient, and she agrees to the above.     I personally reviewed prior external notes and test results pertinent to today's visit.  I have independently reviewed and interpreted all diagnostics ordered during this urgent care visit.     Please note that this dictation was created using voice recognition software. I have made every reasonable attempt to correct obvious errors, but I expect that there may be errors of grammar and possibly content that I did not discover before finalizing the note.     This note was electronically signed by Laine Azar PA-C